# Patient Record
Sex: MALE | Race: WHITE | URBAN - METROPOLITAN AREA
[De-identification: names, ages, dates, MRNs, and addresses within clinical notes are randomized per-mention and may not be internally consistent; named-entity substitution may affect disease eponyms.]

---

## 2017-01-23 DIAGNOSIS — I10 ESSENTIAL HYPERTENSION WITH GOAL BLOOD PRESSURE LESS THAN 140/90: Primary | ICD-10-CM

## 2017-01-23 RX ORDER — LISINOPRIL 40 MG/1
TABLET ORAL
Qty: 90 TABLET | Refills: 0 | Status: SHIPPED | OUTPATIENT
Start: 2017-01-23 | End: 2017-04-24

## 2017-03-12 DIAGNOSIS — I10 ESSENTIAL HYPERTENSION WITH GOAL BLOOD PRESSURE LESS THAN 140/90: ICD-10-CM

## 2017-03-14 RX ORDER — HYDROCHLOROTHIAZIDE 25 MG/1
TABLET ORAL
Qty: 90 TABLET | Refills: 0 | Status: SHIPPED | OUTPATIENT
Start: 2017-03-14 | End: 2017-05-18

## 2017-03-14 NOTE — TELEPHONE ENCOUNTER
Hydrochlorothiazide       Last Written Prescription Date: 12/09/2016  Last Fill Quantity: 90, # refills: 0  Last Office Visit with G, [facility] or [facility] prescribing provider: 09/14/2016       Potassium   Date Value Ref Range Status   06/15/2016 4.2 3.4 - 5.3 mmol/L Final     Creatinine   Date Value Ref Range Status   06/15/2016 1.11 0.66 - 1.25 mg/dL Final     BP Readings from Last 3 Encounters:   12/01/16 126/84   09/14/16 134/88   06/14/16 131/76

## 2017-04-24 DIAGNOSIS — I10 ESSENTIAL HYPERTENSION WITH GOAL BLOOD PRESSURE LESS THAN 140/90: ICD-10-CM

## 2017-04-24 RX ORDER — LISINOPRIL 40 MG/1
TABLET ORAL
Qty: 30 TABLET | Refills: 0 | Status: SHIPPED | OUTPATIENT
Start: 2017-04-24 | End: 2017-05-18

## 2017-04-24 NOTE — TELEPHONE ENCOUNTER
Pending Prescriptions:                       Disp   Refills    lisinopril (PRINIVIL/ZESTRIL) 40 MG table*90 tab*0            Sig: TAKE 1 TABLET (40 MG) BY MOUTH DAILY        Last Written Prescription Date: 1/23/2017  Last Fill Quantity: 90, # refills: 0  Last Office Visit with G, P or [facility] prescribing provider: 9/16/2016       Potassium   Date Value Ref Range Status   06/15/2016 4.2 3.4 - 5.3 mmol/L Final     Creatinine   Date Value Ref Range Status   06/15/2016 1.11 0.66 - 1.25 mg/dL Final     BP Readings from Last 3 Encounters:   12/01/16 126/84   09/14/16 134/88   06/14/16 131/76

## 2017-04-24 NOTE — TELEPHONE ENCOUNTER
Medication is being filled for 1 time refill only due to:  Patient needs to be seen because due for 6 month follow up. Last seen 9/14/16.   Note from 9/14/16 OV:  HTN- okay control on current meds (lisinopril 40mg/d and HCTZ 25mg/d)- will cont to monitor.   Cont q6mo f/u.    Tara Lawson RN

## 2017-05-18 DIAGNOSIS — I10 ESSENTIAL HYPERTENSION WITH GOAL BLOOD PRESSURE LESS THAN 140/90: ICD-10-CM

## 2017-05-18 DIAGNOSIS — E78.5 HYPERLIPIDEMIA LDL GOAL <100: ICD-10-CM
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
## 2017-05-19 RX ORDER — LISINOPRIL 40 MG/1
TABLET ORAL
Qty: 30 TABLET | Refills: 1 | Status: SHIPPED | OUTPATIENT
Start: 2017-05-19 | End: 2017-07-14

## 2017-05-19 RX ORDER — HYDROCHLOROTHIAZIDE 25 MG/1
TABLET ORAL
Qty: 30 TABLET | Refills: 1 | Status: SHIPPED | OUTPATIENT
Start: 2017-05-19 | End: 2017-07-14

## 2017-05-19 RX ORDER — ATORVASTATIN CALCIUM 20 MG/1
20 TABLET, FILM COATED ORAL DAILY
Qty: 30 TABLET | Refills: 1 | Status: SHIPPED | OUTPATIENT
Start: 2017-05-19 | End: 2017-07-14

## 2017-05-19 NOTE — TELEPHONE ENCOUNTER
Sent in rx with one refill (I will be out towards the end of June for a week, and want to make sure he has rx's).  He should come fasting to his next appt.   Thanks!  CW

## 2017-05-23 DIAGNOSIS — I10 ESSENTIAL HYPERTENSION WITH GOAL BLOOD PRESSURE LESS THAN 140/90: ICD-10-CM

## 2017-05-23 RX ORDER — LISINOPRIL 40 MG/1
TABLET ORAL
Start: 2017-05-23

## 2017-05-23 NOTE — TELEPHONE ENCOUNTER
Lisinopril      Last Written Prescription Date: 05/19/2017  Last Fill Quantity: 30, # refills: 1  Last Office Visit with G, P or [facility] prescribing provider: 09/14/2016       Potassium   Date Value Ref Range Status   06/15/2016 4.2 3.4 - 5.3 mmol/L Final     Creatinine   Date Value Ref Range Status   06/15/2016 1.11 0.66 - 1.25 mg/dL Final     BP Readings from Last 3 Encounters:   12/01/16 126/84   09/14/16 134/88   06/14/16 131/76

## 2017-07-14 DIAGNOSIS — I10 ESSENTIAL HYPERTENSION WITH GOAL BLOOD PRESSURE LESS THAN 140/90: ICD-10-CM

## 2017-07-14 DIAGNOSIS — E78.5 HYPERLIPIDEMIA LDL GOAL <100: ICD-10-CM
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
## 2017-07-14 NOTE — TELEPHONE ENCOUNTER
Lisinopril and HCTZ      Last Written Prescription Date: 5-19-17 for both  Last Fill Quantity: 30, # refills: 1  For both  Last [facility] or [facility] prescribing provider: 9-14-16       Potassium   Date Value Ref Range Status   06/15/2016 4.2 3.4 - 5.3 mmol/L Final     Creatinine   Date Value Ref Range Status   06/15/2016 1.11 0.66 - 1.25 mg/dL Final     BP Readings from Last 3 Encounters:   12/01/16 126/84   09/14/16 134/88   06/14/16 131/76     Atorvastatin     Last Written Prescription Date: 5-19-17  Last Fill Quantity: 30, # refills: 1  Last Office Visit with [facility] or [facility] prescribing provider: 9-14-16       Lab Results   Component Value Date    CHOL 139 08/18/2016     Lab Results   Component Value Date    HDL 50 08/18/2016     Lab Results   Component Value Date    LDL 77 08/18/2016     Lab Results   Component Value Date    TRIG 59 08/18/2016     Lab Results   Component Value Date    CHOLHDLRATIO 4.0 10/28/2015

## 2017-07-18 RX ORDER — ATORVASTATIN CALCIUM 20 MG/1
TABLET, FILM COATED ORAL
Qty: 30 TABLET | Refills: 0 | Status: SHIPPED | OUTPATIENT
Start: 2017-07-18 | End: 2017-08-02

## 2017-07-18 RX ORDER — LISINOPRIL 40 MG/1
TABLET ORAL
Qty: 30 TABLET | Refills: 0 | Status: SHIPPED | OUTPATIENT
Start: 2017-07-18 | End: 2017-08-02

## 2017-07-18 RX ORDER — HYDROCHLOROTHIAZIDE 25 MG/1
TABLET ORAL
Qty: 30 TABLET | Refills: 0 | Status: SHIPPED | OUTPATIENT
Start: 2017-07-18 | End: 2017-08-02

## 2017-07-18 NOTE — TELEPHONE ENCOUNTER
CW,  Patient has upcoming OV with you 8/2  Ok to send in 30 day supply?  Patient overdue for appt with you  Anisha HO RN

## 2017-08-02 ENCOUNTER — OFFICE VISIT (OUTPATIENT)
Dept: FAMILY MEDICINE | Facility: CLINIC | Age: 57
End: 2017-08-02
Payer: COMMERCIAL

## 2017-08-02 VITALS
BODY MASS INDEX: 30.38 KG/M2 | SYSTOLIC BLOOD PRESSURE: 127 MMHG | TEMPERATURE: 97 F | HEART RATE: 62 BPM | HEIGHT: 71 IN | WEIGHT: 217 LBS | DIASTOLIC BLOOD PRESSURE: 84 MMHG | OXYGEN SATURATION: 97 %

## 2017-08-02 DIAGNOSIS — E78.5 HYPERLIPIDEMIA LDL GOAL <100: ICD-10-CM

## 2017-08-02 DIAGNOSIS — I10 ESSENTIAL HYPERTENSION WITH GOAL BLOOD PRESSURE LESS THAN 140/90: ICD-10-CM

## 2017-08-02 PROCEDURE — 99213 OFFICE O/P EST LOW 20 MIN: CPT | Performed by: FAMILY MEDICINE

## 2017-08-02 RX ORDER — HYDROCHLOROTHIAZIDE 25 MG/1
TABLET ORAL
Qty: 90 TABLET | Refills: 1 | Status: SHIPPED | OUTPATIENT
Start: 2017-08-02 | End: 2018-02-21

## 2017-08-02 RX ORDER — LISINOPRIL 40 MG/1
TABLET ORAL
Qty: 90 TABLET | Refills: 1 | Status: SHIPPED | OUTPATIENT
Start: 2017-08-02 | End: 2018-02-21

## 2017-08-02 RX ORDER — ATORVASTATIN CALCIUM 20 MG/1
TABLET, FILM COATED ORAL
Qty: 90 TABLET | Refills: 1 | Status: SHIPPED | OUTPATIENT
Start: 2017-08-02 | End: 2018-02-21

## 2017-08-02 NOTE — MR AVS SNAPSHOT
After Visit Summary   8/2/2017    Joe Wilkins    MRN: [id number]           Patient Information     Date Of Birth          1960        Visit Information        Provider Department      8/2/2017 1:00 PM Elida Burris MD [facility]        Today's Diagnoses     Essential hypertension with goal blood pressure less than 140/90        Hyperlipidemia LDL goal <100           Follow-ups after your visit        Future tests that were ordered for you today     Open Future Orders        Priority Expected Expires Ordered    Lipid panel reflex to direct LDL Routine 8/9/2017 2/2/2018 8/2/2017    Albumin Random Urine Quantitative Routine 8/9/2017 2/2/2018 8/2/2017    Comprehensive metabolic panel (BMP + Alb, Alk Phos, ALT, AST, Total. Bili, TP) Routine 8/9/2017 2/2/2018 8/2/2017            Who to contact     If you have questions or need follow up information about today's clinic visit or your schedule please contact [facility] directly at [phone].  Normal or non-critical lab and imaging results will be communicated to you by [employer], letter or phone within 4 business days after the clinic has received the results. If you do not hear from us within 7 days, please contact the clinic through [employer] or phone. If you have a critical or abnormal lab result, we will notify you by phone as soon as possible.  Submit refill requests through [employer] or call your pharmacy and they will forward the refill request to us. Please allow 3 business days for your refill to be completed.          Additional Information About Your Visit        [employer] Information     [employer] gives you secure access to your electronic health record. If you see a primary care provider, you can also send messages to your care team and make appointments. If you have questions, please call your primary care clinic.  If you do not have a primary care provider, please call [phone] and they will assist you.       "  Care EveryWhere ID     This is your Care EveryWhere ID. This could be used by other organizations to access your [place] medical records  [id number]        Your Vitals Were     Pulse Temperature Height Pulse Oximetry BMI (Body Mass Index)       62 97  F (36.1  C) (Oral) 5' 10.5\" (1.791 m) 97% 30.7 kg/m2        Blood Pressure from Last 3 Encounters:   08/02/17 127/84   12/01/16 126/84   09/14/16 134/88    Weight from Last 3 Encounters:   08/02/17 217 lb (98.4 kg)   12/01/16 214 lb 12.8 oz (97.4 kg)   09/14/16 212 lb 4.8 oz (96.3 kg)                 Today's Medication Changes          These changes are accurate as of: 8/2/17  1:36 PM.  If you have any questions, ask your nurse or doctor.               These medicines have changed or have updated prescriptions.        Dose/Directions    atorvastatin 20 MG tablet   Commonly known as:  LIPITOR   This may have changed:  See the new instructions.   Used for:  Hyperlipidemia LDL goal <100   Changed by:  Elida Burris MD        TAKE 1 TABLET (20 MG) BY MOUTH DAILY   Quantity:  90 tablet   Refills:  1       hydrochlorothiazide 25 MG tablet   Commonly known as:  HYDRODIURIL   This may have changed:  See the new instructions.   Used for:  Essential hypertension with goal blood pressure less than 140/90   Changed by:  Elida Burris MD        TAKE 1 TABLET (25 MG) BY MOUTH DAILY   Quantity:  90 tablet   Refills:  1       lisinopril 40 MG tablet   Commonly known as:  PRINIVIL/ZESTRIL   This may have changed:  See the new instructions.   Used for:  Essential hypertension with goal blood pressure less than 140/90   Changed by:  Elida Burris MD        TAKE 1 TABLET (40 MG) BY MOUTH DAILY   Quantity:  90 tablet   Refills:  1            Where to get your medicines      These medications were sent to [id number] - [place], [place] - [address]  [address] [address]     Phone:  [phone]     atorvastatin 20 MG " tablet    hydrochlorothiazide 25 MG tablet    lisinopril 40 MG tablet                Primary Care Provider Office Phone # Fax #    Elida Burris [phone]       [facility] [address]  [facility] [address]        Equal Access to Services     JAKY PACE : Hadii aad ku hadasho Soomaali, waaxda luqadaha, qaybta kaalmada adeegyada, waxay rossanain hayaan adesandra khbrandenfarzaneh millan. So [facility] [phone].    ATENCIÓN: Si habla español, tiene a bal disposición servicios gratuitos de asistencia lingüística. Sophie al [phone].    We comply with applicable federal civil rights laws and [place] laws. We do not discriminate on the basis of race, color, national origin, age, disability sex, sexual orientation or gender identity.            Thank you!     Thank you for choosing [facility]  for your care. Our goal is always to provide you with excellent care. Hearing back from our patients is one way we can continue to improve our services. Please take a few minutes to complete the written survey that you may receive in the mail after your visit with us. Thank you!             Your Updated Medication List - Protect others around you: Learn how to safely use, store and throw away your medicines at www.disposemymeds.org.          This list is accurate as of: 8/2/17  1:36 PM.  Always use your most recent med list.                   Brand Name Dispense Instructions for use Diagnosis    ASPIRIN PO      Take 81 mg by mouth daily        atorvastatin 20 MG tablet    LIPITOR    90 tablet    TAKE 1 TABLET (20 MG) BY MOUTH DAILY    Hyperlipidemia LDL goal <100       cetirizine 10 MG tablet    zyrTEC     Take 10 mg by mouth daily    Allergic rhinitis due to pollen       fluticasone 50 MCG/ACT spray    FLONASE    16 g    Spray 1-2 sprays into both nostrils daily    Allergic rhinitis due to pollen       hydrochlorothiazide 25 MG tablet    HYDRODIURIL    90 tablet    TAKE 1 TABLET  (25 MG) BY MOUTH DAILY    Essential hypertension with goal blood pressure less than 140/90       lisinopril 40 MG tablet    PRINIVIL/ZESTRIL    90 tablet    TAKE 1 TABLET (40 MG) BY MOUTH DAILY    Essential hypertension with goal blood pressure less than 140/90

## 2017-08-02 NOTE — PROGRESS NOTES
SUBJECTIVE:                                                    Joe Wilkins is a 57 year old male who presents to clinic today for the following health issues:      Hypertension Follow-up    Outpatient blood pressures are being checked at home.  Results are 128/85.    Low Salt Diet: no added salt    Amount of exercise or physical activity: 2-3 days/week for an average of 2-3 miles  - plays golf    Problems taking medications regularly: No    Medication side effects: none    Diet: regular (no restrictions)    BP in good control, no issues.    Lipids- started on statin about a year ago.  Mild muscle aches, but only after walking for 18-round golf, no concerns, not interested in taking co-q-10 for it.    No other concerns.    He is not fasting today.    Problem list and histories reviewed & adjusted, as indicated.  Additional history: as documented    Patient Active Problem List   Diagnosis     Essential hypertension with goal blood pressure less than 140/90     CARDIOVASCULAR SCREENING; LDL GOAL LESS THAN 160     Allergic rhinitis due to pollen     Advanced directives, counseling/discussion      Current Outpatient Prescriptions   Medication Sig Dispense Refill     atorvastatin (LIPITOR) 20 MG tablet TAKE 1 TABLET (20 MG) BY MOUTH DAILY 90 tablet 1     lisinopril (PRINIVIL/ZESTRIL) 40 MG tablet TAKE 1 TABLET (40 MG) BY MOUTH DAILY 90 tablet 1     hydrochlorothiazide (HYDRODIURIL) 25 MG tablet TAKE 1 TABLET (25 MG) BY MOUTH DAILY 90 tablet 1     cetirizine (ZYRTEC) 10 MG tablet Take 10 mg by mouth daily       ASPIRIN PO Take 81 mg by mouth daily       [DISCONTINUED] atorvastatin (LIPITOR) 20 MG tablet TAKE 1 TABLET (20 MG) BY MOUTH DAILY 30 tablet 0     [DISCONTINUED] lisinopril (PRINIVIL/ZESTRIL) 40 MG tablet TAKE 1 TABLET (40 MG) BY MOUTH DAILY 30 tablet 0     [DISCONTINUED] hydrochlorothiazide (HYDRODIURIL) 25 MG tablet TAKE 1 TABLET (25 MG) BY MOUTH DAILY 30 tablet 0     fluticasone (FLONASE) 50 MCG/ACT nasal spray  "Spray 1-2 sprays into both nostrils daily 16 g 5     No Known Allergies  Recent Labs   Lab Test  09/14/16   1022  08/18/16   0745  06/15/16   0805  10/28/15   0929   LDL   --   77  154*  147*   HDL   --   50  48  53   TRIG   --   59  104  57   ALT  49   --   51   --    CR   --    --   1.11  1.14   GFRESTIMATED   --    --   68  66   GFRESTBLACK   --    --   83  80   POTASSIUM   --    --   4.2  4.1      BP Readings from Last 3 Encounters:   08/02/17 127/84   12/01/16 126/84   09/14/16 134/88    Wt Readings from Last 3 Encounters:   08/02/17 217 lb (98.4 kg)   12/01/16 214 lb 12.8 oz (97.4 kg)   09/14/16 212 lb 4.8 oz (96.3 kg)                  Labs reviewed in EPIC        Reviewed and updated as needed this visit by clinical staffTobacco  Allergies  Meds  Med Hx  Surg Hx  Fam Hx  Soc Hx      Reviewed and updated as needed this visit by Provider         ROS:  Constitutional, HEENT, cardiovascular, pulmonary, gi and gu systems are negative, except as otherwise noted.      OBJECTIVE:   /84  Pulse 62  Temp 97  F (36.1  C) (Oral)  Ht 5' 10.5\" (1.791 m)  Wt 217 lb (98.4 kg)  SpO2 97%  BMI 30.7 kg/m2  Body mass index is 30.7 kg/(m^2).  GENERAL APPEARANCE: healthy, alert and no distress     EYES: PERRL, sclera clear     HENT: nose and mouth without ulcers or lesions     NECK: no adenopathy, no asymmetry, masses, or scars and thyroid normal to palpation     RESP: lungs clear to auscultation - no rales, rhonchi or wheezes     CV: regular rates and rhythm, normal S1 S2, no S3 or S4 and no murmur, click or rub      Abdomen: soft, nontender, no HSM or masses and bowel sounds normal     Ext: warm, dry, no edema       Diagnostic Test Results:  none     ASSESSMENT/PLAN:       ICD-10-CM    1. Essential hypertension with goal blood pressure less than 140/90 I10 lisinopril (PRINIVIL/ZESTRIL) 40 MG tablet     hydrochlorothiazide (HYDRODIURIL) 25 MG tablet     Albumin Random Urine Quantitative     Comprehensive metabolic " panel (BMP + Alb, Alk Phos, ALT, AST, Total. Bili, TP)   2. Hyperlipidemia LDL goal <100 E78.5 atorvastatin (LIPITOR) 20 MG tablet     Lipid panel reflex to direct LDL     Comprehensive metabolic panel (BMP + Alb, Alk Phos, ALT, AST, Total. Bili, TP)     HTN/Lipids-  Doing well, no concerns, BP looks good.  Taking meds regularly without significant se's (just rare mild muscle aches- not interested in co-q-10 trial).  He is due for yearly fasting labs, though is not fasting today.  Will rtc next week for fasting labs, and do microalbumin at that time as well.  Will send mychart results.  Rec f/u appt in 6 months for physical.      Elida Burris MD  [facility]

## 2017-08-02 NOTE — NURSING NOTE
"Chief Complaint   Patient presents with     Hypertension     follow up     /84  Pulse 62  Temp 97  F (36.1  C) (Oral)  Ht 5' 10.5\" (1.791 m)  Wt 217 lb (98.4 kg)  SpO2 97%  BMI 30.7 kg/m2 Estimated body mass index is 30.7 kg/(m^2) as calculated from the following:    Height as of this encounter: 5' 10.5\" (1.791 m).    Weight as of this encounter: 217 lb (98.4 kg).  BP completed using cuff size: regular       Health Maintenance due pending provider review:  NONE    n/a      Ashleigh Rizzo CMA  "

## 2017-08-08 DIAGNOSIS — E78.5 HYPERLIPIDEMIA LDL GOAL <100: ICD-10-CM

## 2017-08-08 DIAGNOSIS — I10 ESSENTIAL HYPERTENSION WITH GOAL BLOOD PRESSURE LESS THAN 140/90: ICD-10-CM

## 2017-08-08 LAB
ALBUMIN SERPL-MCNC: 3.8 G/DL (ref 3.4–5)
ALP SERPL-CCNC: 66 U/L (ref 40–150)
ALT SERPL W P-5'-P-CCNC: 41 U/L (ref 0–70)
ANION GAP SERPL CALCULATED.3IONS-SCNC: 8 MMOL/L (ref 3–14)
AST SERPL W P-5'-P-CCNC: 24 U/L (ref 0–45)
BILIRUB SERPL-MCNC: 0.5 MG/DL (ref 0.2–1.3)
BUN SERPL-MCNC: 15 MG/DL (ref 7–30)
CALCIUM SERPL-MCNC: 9 MG/DL (ref 8.5–10.1)
CHLORIDE SERPL-SCNC: 105 MMOL/L (ref 94–109)
CHOLEST SERPL-MCNC: 146 MG/DL
CO2 SERPL-SCNC: 27 MMOL/L (ref 20–32)
CREAT SERPL-MCNC: 1.17 MG/DL (ref 0.66–1.25)
CREAT UR-MCNC: 112 MG/DL
GFR SERPL CREATININE-BSD FRML MDRD: 64 ML/MIN/1.7M2
GLUCOSE SERPL-MCNC: 109 MG/DL (ref 70–99)
HDLC SERPL-MCNC: 49 MG/DL
LDLC SERPL CALC-MCNC: 87 MG/DL
MICROALBUMIN UR-MCNC: 6 MG/L
MICROALBUMIN/CREAT UR: 5.28 MG/G CR (ref 0–17)
NONHDLC SERPL-MCNC: 97 MG/DL
POTASSIUM SERPL-SCNC: 3.8 MMOL/L (ref 3.4–5.3)
PROT SERPL-MCNC: 6.7 G/DL (ref 6.8–8.8)
SODIUM SERPL-SCNC: 140 MMOL/L (ref 133–144)
TRIGL SERPL-MCNC: 52 MG/DL

## 2017-08-08 PROCEDURE — 82043 UR ALBUMIN QUANTITATIVE: CPT | Performed by: FAMILY MEDICINE

## 2017-08-08 PROCEDURE — 36415 COLL VENOUS BLD VENIPUNCTURE: CPT | Performed by: FAMILY MEDICINE

## 2017-08-08 PROCEDURE — 80053 COMPREHEN METABOLIC PANEL: CPT | Performed by: FAMILY MEDICINE

## 2017-08-08 PROCEDURE — 80061 LIPID PANEL: CPT | Performed by: FAMILY MEDICINE

## 2017-08-11 NOTE — PROGRESS NOTES
Here are your lab results from your recent visit...  -Your CMP (which includes electrolyte levels, blood sugar levels, and kidney and liver function tests) looks normal other than the elevated glucose, which is in the pre-diabetic range because you were fasting (100-125 is the pre-diabetic range).  We should continue to check this yearly, and cutting back on carbohydrates, sweets and sugars while increasing vegetables and lean proteins may help.  -Your microalbumin level (which is the urine test that can signal signs of early chronic kidney disease if elevated to >30) is normal.  -Your cholesterol panel looks very good with a low LDL (the bad cholesterol) and a decent HDL (the good cholesterol).     Please let me know if you have any questions.  Best,   Thomas Burris MD

## 2017-08-20 DIAGNOSIS — E78.5 HYPERLIPIDEMIA LDL GOAL <100: ICD-10-CM

## 2017-08-21 RX ORDER — ATORVASTATIN CALCIUM 20 MG/1
TABLET, FILM COATED ORAL
Start: 2017-08-21

## 2017-08-21 NOTE — TELEPHONE ENCOUNTER
Disp Refills Start End JUANA      atorvastatin (LIPITOR) 20 MG tablet 90 tablet 1 8/2/2017  No

## 2018-02-21 DIAGNOSIS — E78.5 HYPERLIPIDEMIA LDL GOAL <100: ICD-10-CM

## 2018-02-21 DIAGNOSIS — I10 ESSENTIAL HYPERTENSION WITH GOAL BLOOD PRESSURE LESS THAN 140/90: ICD-10-CM

## 2018-02-21 RX ORDER — LISINOPRIL 40 MG/1
TABLET ORAL
Qty: 30 TABLET | Refills: 0 | Status: SHIPPED | OUTPATIENT
Start: 2018-02-21 | End: 2018-03-21

## 2018-02-21 RX ORDER — HYDROCHLOROTHIAZIDE 25 MG/1
TABLET ORAL
Qty: 30 TABLET | Refills: 0 | Status: SHIPPED | OUTPATIENT
Start: 2018-02-21 | End: 2018-03-21

## 2018-02-21 RX ORDER — ATORVASTATIN CALCIUM 20 MG/1
TABLET, FILM COATED ORAL
Qty: 30 TABLET | Refills: 0 | Status: SHIPPED | OUTPATIENT
Start: 2018-02-21 | End: 2018-03-21

## 2018-02-21 NOTE — TELEPHONE ENCOUNTER
"Medication is being filled for 1 time refill only due to:  Patient needs to be seen because due for physical.\  Note from 8/8/17 OV:  Rec f/u appt in 6 months for physical.  Tara Lawson RN    Requested Prescriptions   Signed Prescriptions Disp Refills     hydrochlorothiazide (HYDRODIURIL) 25 MG tablet 30 tablet 0     Sig: TAKE 1 TABLET (25 MG) BY MOUTH DAILY    Diuretics (Including Combos) Protocol Passed    2/21/2018  5:42 AM       Passed - Blood pressure under 140/90 in past 12 months    BP Readings from Last 3 Encounters:   08/02/17 127/84   12/01/16 126/84   09/14/16 134/88                Passed - Recent or future visit with authorizing provider's specialty    Patient had office visit in the last year or has a visit in the next 30 days with authorizing provider.  See \"Patient Info\" tab in inbasket, or \"Choose Columns\" in Meds & Orders section of the refill encounter.            Passed - Patient is age 18 or older       Passed - Normal serum creatinine on file in past 12 months    Recent Labs   Lab Test  08/08/17   0739   CR  1.17             Passed - Normal serum potassium on file in past 12 months    Recent Labs   Lab Test  08/08/17   0739   POTASSIUM  3.8                   Passed - Normal serum sodium on file in past 12 months    Recent Labs   Lab Test  08/08/17   0739   NA  140              lisinopril (PRINIVIL/ZESTRIL) 40 MG tablet 30 tablet 0     Sig: TAKE 1 TABLET (40 MG) BY MOUTH DAILY    ACE Inhibitors (Including Combos) Protocol Passed    2/21/2018  5:42 AM       Passed - Blood pressure under 140/90 in past 12 months    BP Readings from Last 3 Encounters:   08/02/17 127/84   12/01/16 126/84   09/14/16 134/88                Passed - Recent or future visit with authorizing provider's specialty    Patient had office visit in the last year or has a visit in the next 30 days with authorizing provider.  See \"Patient Info\" tab in inbasket, or \"Choose Columns\" in Meds & Orders section of the refill encounter. " "           Passed - Patient is age 18 or older       Passed - Normal serum creatinine on file in past 12 months    Recent Labs   Lab Test  08/08/17   0739   CR  1.17            Passed - Normal serum potassium on file in past 12 months    Recent Labs   Lab Test  08/08/17   0739   POTASSIUM  3.8             atorvastatin (LIPITOR) 20 MG tablet 30 tablet 0     Sig: TAKE 1 TABLET (20 MG) BY MOUTH DAILY    Statins Protocol Passed    2/21/2018  5:42 AM       Passed - LDL on file in past 12 months    Recent Labs   Lab Test  08/08/17   0739   LDL  87            Passed - No abnormal creatine kinase in past 12 months    No lab results found.         Passed - Recent or future visit with authorizing provider    Patient had office visit in the last year or has a visit in the next 30 days with authorizing provider.  See \"Patient Info\" tab in inbasket, or \"Choose Columns\" in Meds & Orders section of the refill encounter.            Passed - Patient is age 18 or older            "

## 2018-03-21 DIAGNOSIS — I10 ESSENTIAL HYPERTENSION WITH GOAL BLOOD PRESSURE LESS THAN 140/90: ICD-10-CM

## 2018-03-21 DIAGNOSIS — E78.5 HYPERLIPIDEMIA LDL GOAL <100: ICD-10-CM

## 2018-03-22 NOTE — TELEPHONE ENCOUNTER
"Per CW in 8/2/2017 OV, f/u in 6 months for physical  [employer] message sent to pt advising appt  Anisha HO RN    Requested Prescriptions   Pending Prescriptions Disp Refills     hydrochlorothiazide (HYDRODIURIL) 25 MG tablet [Pharmacy Med Name: HYDROCHLOROTHIAZIDE 25 MG TAB] 30 tablet 0     Sig: TAKE 1 TABLET (25 MG) BY MOUTH DAILY    Diuretics (Including Combos) Protocol Passed    3/21/2018  6:18 PM       Passed - Blood pressure under 140/90 in past 12 months    BP Readings from Last 3 Encounters:   08/02/17 127/84   12/01/16 126/84   09/14/16 134/88                Passed - Recent (12 mo) or future (30 days) visit within the authorizing provider's specialty    Patient had office visit in the last 12 months or has a visit in the next 30 days with authorizing provider or within the authorizing provider's specialty.  See \"Patient Info\" tab in inbasket, or \"Choose Columns\" in Meds & Orders section of the refill encounter.           Passed - Patient is age 18 or older       Passed - Normal serum creatinine on file in past 12 months    Recent Labs   Lab Test  08/08/17   0739   CR  1.17             Passed - Normal serum potassium on file in past 12 months    Recent Labs   Lab Test  08/08/17   0739   POTASSIUM  3.8                   Passed - Normal serum sodium on file in past 12 months    Recent Labs   Lab Test  08/08/17   0739   NA  140              lisinopril (PRINIVIL/ZESTRIL) 40 MG tablet [Pharmacy Med Name: LISINOPRIL 40 MG TABLET] 30 tablet 0     Sig: TAKE 1 TABLET (40 MG) BY MOUTH DAILY    ACE Inhibitors (Including Combos) Protocol Passed    3/21/2018  6:18 PM       Passed - Blood pressure under 140/90 in past 12 months    BP Readings from Last 3 Encounters:   08/02/17 127/84   12/01/16 126/84   09/14/16 134/88                Passed - Recent (12 mo) or future (30 days) visit within the authorizing provider's specialty    Patient had office visit in the last 12 months or has a visit in the next 30 days with " "authorizing provider or within the authorizing provider's specialty.  See \"Patient Info\" tab in inbasket, or \"Choose Columns\" in Meds & Orders section of the refill encounter.           Passed - Patient is age 18 or older       Passed - Normal serum creatinine on file in past 12 months    Recent Labs   Lab Test  08/08/17   0739   CR  1.17            Passed - Normal serum potassium on file in past 12 months    Recent Labs   Lab Test  08/08/17   0739   POTASSIUM  3.8             atorvastatin (LIPITOR) 20 MG tablet [Pharmacy Med Name: ATORVASTATIN 20 MG TABLET] 30 tablet 0     Sig: TAKE 1 TABLET (20 MG) BY MOUTH DAILY    Statins Protocol Passed    3/21/2018  6:18 PM       Passed - LDL on file in past 12 months    Recent Labs   Lab Test  08/08/17   0739   LDL  87            Passed - No abnormal creatine kinase in past 12 months    Recent Labs   Lab Test  09/14/16   1022   CKT  71               Passed - Recent (12 mo) or future (30 days) visit within the authorizing provider's specialty    Patient had office visit in the last 12 months or has a visit in the next 30 days with authorizing provider or within the authorizing provider's specialty.  See \"Patient Info\" tab in inbasket, or \"Choose Columns\" in Meds & Orders section of the refill encounter.           Passed - Patient is age 18 or older              "

## 2018-03-26 NOTE — TELEPHONE ENCOUNTER
Patient given 1 month supply of medications on 2/21/18  Per CW in 8/2/2017 OV, f/u in 6 months for physical  Pharmacist informed.    Patient has not read [employer] message sent on 3/22  VM left asking patient to call clinic  Tara Lawson RN

## 2018-03-26 NOTE — TELEPHONE ENCOUNTER
[facility] called to check status of refill rcvd. Advised that refill has been rcvd and is in process.

## 2018-03-27 NOTE — TELEPHONE ENCOUNTER
CW,  See below messages  No response from patient to our outreach  Please advise on further refill  Thanks,  Anisha HO RN

## 2018-03-29 RX ORDER — HYDROCHLOROTHIAZIDE 25 MG/1
TABLET ORAL
Qty: 30 TABLET | Refills: 0 | Status: SHIPPED | OUTPATIENT
Start: 2018-03-29 | End: 2018-05-18

## 2018-03-29 RX ORDER — ATORVASTATIN CALCIUM 20 MG/1
TABLET, FILM COATED ORAL
Qty: 30 TABLET | Refills: 0 | Status: SHIPPED | OUTPATIENT
Start: 2018-03-29 | End: 2018-05-18

## 2018-03-29 RX ORDER — LISINOPRIL 40 MG/1
TABLET ORAL
Qty: 30 TABLET | Refills: 0 | Status: SHIPPED | OUTPATIENT
Start: 2018-03-29 | End: 2018-05-18

## 2018-05-18 ENCOUNTER — OFFICE VISIT (OUTPATIENT)
Dept: FAMILY MEDICINE | Facility: CLINIC | Age: 58
End: 2018-05-18
Payer: COMMERCIAL

## 2018-05-18 VITALS
TEMPERATURE: 98 F | WEIGHT: 215.7 LBS | HEART RATE: 63 BPM | DIASTOLIC BLOOD PRESSURE: 81 MMHG | SYSTOLIC BLOOD PRESSURE: 121 MMHG | BODY MASS INDEX: 30.2 KG/M2 | OXYGEN SATURATION: 97 % | RESPIRATION RATE: 16 BRPM | HEIGHT: 71 IN

## 2018-05-18 DIAGNOSIS — I10 ESSENTIAL HYPERTENSION WITH GOAL BLOOD PRESSURE LESS THAN 140/90: Primary | ICD-10-CM

## 2018-05-18 DIAGNOSIS — R73.09 ELEVATED GLUCOSE: ICD-10-CM

## 2018-05-18 DIAGNOSIS — E78.5 HYPERLIPIDEMIA LDL GOAL <100: ICD-10-CM

## 2018-05-18 DIAGNOSIS — Z12.11 COLON CANCER SCREENING: ICD-10-CM

## 2018-05-18 PROCEDURE — 82274 ASSAY TEST FOR BLOOD FECAL: CPT | Performed by: FAMILY MEDICINE

## 2018-05-18 PROCEDURE — 99214 OFFICE O/P EST MOD 30 MIN: CPT | Performed by: FAMILY MEDICINE

## 2018-05-18 RX ORDER — LISINOPRIL 40 MG/1
TABLET ORAL
Qty: 90 TABLET | Refills: 1 | Status: SHIPPED | OUTPATIENT
Start: 2018-05-18 | End: 2018-11-20

## 2018-05-18 RX ORDER — ATORVASTATIN CALCIUM 20 MG/1
TABLET, FILM COATED ORAL
Qty: 90 TABLET | Refills: 1 | Status: SHIPPED | OUTPATIENT
Start: 2018-05-18 | End: 2018-11-20

## 2018-05-18 RX ORDER — HYDROCHLOROTHIAZIDE 25 MG/1
TABLET ORAL
Qty: 90 TABLET | Refills: 1 | Status: SHIPPED | OUTPATIENT
Start: 2018-05-18 | End: 2018-11-20

## 2018-05-18 NOTE — PROGRESS NOTES
SUBJECTIVE:   Joe Wilkins is a 57 year old male who presents to clinic today for the following health issues:    Hypertension Follow-up    Outpatient blood pressures are being checked at home.  Results are usually normal 120s/80s.  Home monitor.    Low Salt Diet: no added salt    Amount of exercise or physical activity: 2 days (playing golf, walks the 18 holes)    Problems taking medications regularly: No    Medication side effects: none    Diet: regular (no restrictions)    Had some word-finding issues when he first went on meds.  Not sure, but thinks they may be less prominent now.    Lipids- doing well on the lipitor, no se's.  LDL has looked good since going on statin.    Reviewed glucose elevation of 103 and 109 the last couple times.    Hasn't had a physical in awhile- will look into scheduling it as that next time.  Reviewed colon cancer screening - last one done in [place], and thinks he had a polyp and was to recheck in 5 yrs (he had initially said 10-yr f/u).  Now overdue.  Can't have it done here- doesn't have a  as his home is in [place] [facility] (work is based here).  Would be open to doing FIT in meantime.      Problem list and histories reviewed & adjusted, as indicated.  Additional history: as documented    Patient Active Problem List   Diagnosis     Essential hypertension with goal blood pressure less than 140/90     CARDIOVASCULAR SCREENING; LDL GOAL LESS THAN 160     Allergic rhinitis due to pollen     Advanced directives, counseling/discussion     Hyperlipidemia LDL goal <100     Elevated glucose     Past Surgical History:   Procedure Laterality Date     C ANESTH,ACHILLES TENDON SURG  1994     LASIK  2000       Social History   Substance Use Topics     Smoking status: Never Smoker     Smokeless tobacco: Never Used     Alcohol use 0.0 oz/week     0 Standard drinks or equivalent per week      Comment: glass wine 2-3 days     Family History   Problem Relation Age of Onset     Eye Disorder Mother       High cholesterol Father      HEART DISEASE Father 62     pericarditis causing rupture     Hypertension Father      DIABETES Maternal Grandmother      Hypertension Sister      High cholesterol Sister      C.A.D. Paternal Uncle 58     mult uncles w/ CAD in late 50s, early 60s         Current Outpatient Prescriptions   Medication Sig Dispense Refill     ASPIRIN PO Take 81 mg by mouth daily       atorvastatin (LIPITOR) 20 MG tablet TAKE 1 TABLET (20 MG) BY MOUTH DAILY 90 tablet 1     cetirizine (ZYRTEC) 10 MG tablet Take 10 mg by mouth daily       hydrochlorothiazide (HYDRODIURIL) 25 MG tablet TAKE 1 TABLET (25 MG) BY MOUTH DAILY 90 tablet 1     lisinopril (PRINIVIL/ZESTRIL) 40 MG tablet TAKE 1 TABLET (40 MG) BY MOUTH DAILY 90 tablet 1     [DISCONTINUED] atorvastatin (LIPITOR) 20 MG tablet TAKE 1 TABLET (20 MG) BY MOUTH DAILY 30 tablet 0     [DISCONTINUED] hydrochlorothiazide (HYDRODIURIL) 25 MG tablet TAKE 1 TABLET (25 MG) BY MOUTH DAILY 30 tablet 0     [DISCONTINUED] lisinopril (PRINIVIL/ZESTRIL) 40 MG tablet TAKE 1 TABLET (40 MG) BY MOUTH DAILY 30 tablet 0     No Known Allergies  Recent Labs   Lab Test  08/08/17   0739  09/14/16   1022  08/18/16   0745  06/15/16   0805   LDL  87   --   77  154*   HDL  49   --   50  48   TRIG  52   --   59  104   ALT  41  49   --   51   CR  1.17   --    --   1.11   GFRESTIMATED  64   --    --   68   GFRESTBLACK  77   --    --   83   POTASSIUM  3.8   --    --   4.2      BP Readings from Last 3 Encounters:   05/18/18 121/81   08/02/17 127/84   12/01/16 126/84    Wt Readings from Last 3 Encounters:   05/18/18 215 lb 11.2 oz (97.8 kg)   08/02/17 217 lb (98.4 kg)   12/01/16 214 lb 12.8 oz (97.4 kg)                  Labs reviewed in EPIC    Reviewed and updated as needed this visit by clinical staff       Reviewed and updated as needed this visit by Provider         ROS:  Constitutional, HEENT, cardiovascular, pulmonary, gi and gu systems are negative, except as otherwise  "noted.    OBJECTIVE:     /81  Pulse 63  Temp 98  F (36.7  C) (Oral)  Resp 16  Ht 5' 10.5\" (1.791 m)  Wt 215 lb 11.2 oz (97.8 kg)  SpO2 97%  BMI 30.51 kg/m2  Body mass index is 30.51 kg/(m^2).  GENERAL APPEARANCE: healthy, alert and no distress     EYES: PERRL, sclera clear     HENT: nose and mouth without ulcers or lesions     NECK: no adenopathy, no asymmetry, masses, or scars and thyroid normal to palpation     RESP: lungs clear to auscultation - no rales, rhonchi or wheezes     CV: regular rates and rhythm, normal S1 S2, no S3 or S4 and no murmur, click or rub      Abdomen: soft, nontender, no HSM or masses and bowel sounds normal     Ext: warm, dry, no edema         ASSESSMENT/PLAN:       ICD-10-CM    1. Essential hypertension with goal blood pressure less than 140/90 I10 lisinopril (PRINIVIL/ZESTRIL) 40 MG tablet     hydrochlorothiazide (HYDRODIURIL) 25 MG tablet   2. Hyperlipidemia LDL goal <100 E78.5 atorvastatin (LIPITOR) 20 MG tablet   3. Elevated glucose R73.09    4. Colon cancer screening Z12.11 Fecal colorectal cancer screen (FIT)     HTN/Lipids- doing well on meds for both issues, no significant se's.  Will cont.  Cont f/u q6mo.  Next appt likely physical with fasting labs.    Elevated glucose- reviewed pre-diabetic glucose readings with the last couple fasting labs (103 and 109).  Reviewed diet- rec lower carbs (especially refined, look for whole grain options, but less in general), less sweets/sugars as well, weight loss.  Cont to monitor, likely check A1C with next lab draw.    Colon cancer screening- we had q10 yr f/u on HM, likely mistake, pt thinks it was to be q5 yr f/u so now overdue.  Difficult for him to schedule scope, needs to be in [facility], so will do FIT for now, and work on scheduling issues for next year.    Elida Burris MD  [facility]    "

## 2018-05-18 NOTE — MR AVS SNAPSHOT
After Visit Summary   5/18/2018    Joe Wilkins    MRN: [id number]           Patient Information     Date Of Birth          1960        Visit Information        Provider Department      5/18/2018 8:30 AM Elida Burris MD [facility]        Today's Diagnoses     Essential hypertension with goal blood pressure less than 140/90    -  1    Hyperlipidemia LDL goal <100        CARDIOVASCULAR SCREENING; LDL GOAL LESS THAN 160        Colon cancer screening           Follow-ups after your visit        Future tests that were ordered for you today     Open Future Orders        Priority Expected Expires Ordered    Fecal colorectal cancer screen (FIT) Routine 6/8/2018 8/10/2018 5/18/2018            Who to contact     If you have questions or need follow up information about today's clinic visit or your schedule please contact [facility] directly at [phone].  Normal or non-critical lab and imaging results will be communicated to you by boom[employer]hart, letter or phone within 4 business days after the clinic has received the results. If you do not hear from us within 7 days, please contact the clinic through boomtrainhart or phone. If you have a critical or abnormal lab result, we will notify you by phone as soon as possible.  Submit refill requests through [employer] or call your pharmacy and they will forward the refill request to us. Please allow 3 business days for your refill to be completed.          Additional Information About Your Visit        MyChart Information     [employer] gives you secure access to your electronic health record. If you see a primary care provider, you can also send messages to your care team and make appointments. If you have questions, please call your primary care clinic.  If you do not have a primary care provider, please call [phone] and they will assist you.        Care EveryWhere ID     This is your Care EveryWhere ID. This could be used by other  "organizations to access your [place] medical records  [id number]        Your Vitals Were     Pulse Temperature Respirations Height Pulse Oximetry BMI (Body Mass Index)    63 98  F (36.7  C) (Oral) 16 5' 10.5\" (1.791 m) 97% 30.51 kg/m2       Blood Pressure from Last 3 Encounters:   05/18/18 121/81   08/02/17 127/84   12/01/16 126/84    Weight from Last 3 Encounters:   05/18/18 215 lb 11.2 oz (97.8 kg)   08/02/17 217 lb (98.4 kg)   12/01/16 214 lb 12.8 oz (97.4 kg)                 Today's Medication Changes          These changes are accurate as of 5/18/18  8:54 AM.  If you have any questions, ask your nurse or doctor.               These medicines have changed or have updated prescriptions.        Dose/Directions    atorvastatin 20 MG tablet   Commonly known as:  LIPITOR   This may have changed:  See the new instructions.   Used for:  Hyperlipidemia LDL goal <100   Changed by:  Elida Burris MD        TAKE 1 TABLET (20 MG) BY MOUTH DAILY   Quantity:  90 tablet   Refills:  1       hydrochlorothiazide 25 MG tablet   Commonly known as:  HYDRODIURIL   This may have changed:  See the new instructions.   Used for:  Essential hypertension with goal blood pressure less than 140/90   Changed by:  Elida Burris MD        TAKE 1 TABLET (25 MG) BY MOUTH DAILY   Quantity:  90 tablet   Refills:  1       lisinopril 40 MG tablet   Commonly known as:  PRINIVIL/ZESTRIL   This may have changed:  See the new instructions.   Used for:  Essential hypertension with goal blood pressure less than 140/90   Changed by:  Elida Burris MD        TAKE 1 TABLET (40 MG) BY MOUTH DAILY   Quantity:  90 tablet   Refills:  1            Where to get your medicines      These medications were sent to [id number] - [place], [place] - [address] [place] [id number]     Phone:  [phone]     atorvastatin 20 MG tablet    hydrochlorothiazide 25 MG tablet    lisinopril 40 MG " tablet                Primary Care Provider Office Phone # Fax #    Elida Burris [phone] [address] [facility]  [address]  [facility] [address]        Equal Access to Services     JAKY PACE : Hadalessandro monica andrade gilberto Sostanleyali, waaxda luqadaha, qaybta kaalmada adeegyada, stacie negro laAna Lauralisa millan. So [facility] [phone].    ATENCIÓN: Si habla español, tiene a bal disposición servicios gratuitos de asistencia lingüística. Llame al [phone].    We comply with applicable federal civil rights laws and [place] laws. We do not discriminate on the basis of race, color, national origin, age, disability, sex, sexual orientation, or gender identity.            Thank you!     Thank you for choosing [facility]  for your care. Our goal is always to provide you with excellent care. Hearing back from our patients is one way we can continue to improve our services. Please take a few minutes to complete the written survey that you may receive in the mail after your visit with us. Thank you!             Your Updated Medication List - Protect others around you: Learn how to safely use, store and throw away your medicines at www.disposemymeds.org.          This list is accurate as of 5/18/18  8:54 AM.  Always use your most recent med list.                   Brand Name Dispense Instructions for use Diagnosis    ASPIRIN PO      Take 81 mg by mouth daily        atorvastatin 20 MG tablet    LIPITOR    90 tablet    TAKE 1 TABLET (20 MG) BY MOUTH DAILY    Hyperlipidemia LDL goal <100       cetirizine 10 MG tablet    zyrTEC     Take 10 mg by mouth daily    Allergic rhinitis due to pollen       hydrochlorothiazide 25 MG tablet    HYDRODIURIL    90 tablet    TAKE 1 TABLET (25 MG) BY MOUTH DAILY    Essential hypertension with goal blood pressure less than 140/90       lisinopril 40 MG tablet    PRINIVIL/ZESTRIL    90 tablet    TAKE 1 TABLET (40 MG) BY MOUTH DAILY    Essential hypertension  with goal blood pressure less than 140/90

## 2018-05-18 NOTE — NURSING NOTE
"Chief Complaint   Patient presents with     Hypertension     /81  Pulse 63  Temp 98  F (36.7  C) (Oral)  Resp 16  Ht 5' 10.5\" (1.791 m)  Wt 215 lb 11.2 oz (97.8 kg)  SpO2 97%  BMI 30.51 kg/m2 Estimated body mass index is 30.51 kg/(m^2) as calculated from the following:    Height as of this encounter: 5' 10.5\" (1.791 m).    Weight as of this encounter: 215 lb 11.2 oz (97.8 kg).        Health Maintenance due pending provider review:  NONE    n/a    Trish Cha CMA  "

## 2018-05-21 ENCOUNTER — HEALTH MAINTENANCE LETTER (OUTPATIENT)
Age: 58
End: 2018-05-21

## 2018-05-22 DIAGNOSIS — Z12.11 COLON CANCER SCREENING: ICD-10-CM

## 2018-05-22 LAB — HEMOCCULT STL QL IA: NEGATIVE

## 2018-05-22 NOTE — PROGRESS NOTES
Here are your lab results from your recent visit...  -Your FIT test (the screening test for colon cancer by testing for blood in your stool), was negative.  We should continue to check this yearly (unless you have a colonoscopy instead).      Please let me know if you have any questions.  Thomas Perez MD

## 2018-11-20 DIAGNOSIS — I10 ESSENTIAL HYPERTENSION WITH GOAL BLOOD PRESSURE LESS THAN 140/90: ICD-10-CM

## 2018-11-20 DIAGNOSIS — E78.5 HYPERLIPIDEMIA LDL GOAL <100: ICD-10-CM

## 2018-11-21 RX ORDER — LISINOPRIL 40 MG/1
TABLET ORAL
Qty: 30 TABLET | Refills: 0 | Status: SHIPPED | OUTPATIENT
Start: 2018-11-21 | End: 2018-12-23

## 2018-11-21 RX ORDER — ATORVASTATIN CALCIUM 20 MG/1
TABLET, FILM COATED ORAL
Qty: 30 TABLET | Refills: 0 | Status: SHIPPED | OUTPATIENT
Start: 2018-11-21 | End: 2018-12-23

## 2018-11-21 RX ORDER — HYDROCHLOROTHIAZIDE 25 MG/1
TABLET ORAL
Qty: 30 TABLET | Refills: 0 | Status: SHIPPED | OUTPATIENT
Start: 2018-11-21 | End: 2018-12-22

## 2018-11-21 NOTE — TELEPHONE ENCOUNTER
"Medication is being filled for 1 time refill only due to:  Patient needs to be seen because due for fasting physical.   Anisha HO, RN    Requested Prescriptions   Pending Prescriptions Disp Refills     atorvastatin (LIPITOR) 20 MG tablet [Pharmacy Med Name: ATORVASTATIN 20 MG TABLET] 90 tablet 1     Sig: TAKE 1 TABLET (20 MG) BY MOUTH DAILY    Statins Protocol Failed    11/20/2018  1:09 AM       Failed - LDL on file in past 12 months    Recent Labs   Lab Test  08/08/17   0739   LDL  87            Passed - No abnormal creatine kinase in past 12 months    Recent Labs   Lab Test  09/14/16   1022   CKT  71               Passed - Recent (12 mo) or future (30 days) visit within the authorizing provider's specialty    Patient had office visit in the last 12 months or has a visit in the next 30 days with authorizing provider or within the authorizing provider's specialty.  See \"Patient Info\" tab in inbasket, or \"Choose Columns\" in Meds & Orders section of the refill encounter.             Passed - Patient is age 18 or older        lisinopril (PRINIVIL/ZESTRIL) 40 MG tablet [Pharmacy Med Name: LISINOPRIL 40 MG TABLET] 90 tablet 1     Sig: TAKE 1 TABLET (40 MG) BY MOUTH DAILY    ACE Inhibitors (Including Combos) Protocol Failed    11/20/2018  1:09 AM       Failed - Normal serum creatinine on file in past 12 months    Recent Labs   Lab Test  08/08/17   0739   CR  1.17            Failed - Normal serum potassium on file in past 12 months    Recent Labs   Lab Test  08/08/17   0739   POTASSIUM  3.8            Passed - Blood pressure under 140/90 in past 12 months    BP Readings from Last 3 Encounters:   05/18/18 121/81   08/02/17 127/84   12/01/16 126/84                Passed - Recent (12 mo) or future (30 days) visit within the authorizing provider's specialty    Patient had office visit in the last 12 months or has a visit in the next 30 days with authorizing provider or within the authorizing provider's specialty.  See \"Patient " "Info\" tab in inbasket, or \"Choose Columns\" in Meds & Orders section of the refill encounter.             Passed - Patient is age 18 or older        hydrochlorothiazide (HYDRODIURIL) 25 MG tablet [Pharmacy Med Name: HYDROCHLOROTHIAZIDE 25 MG TAB] 90 tablet 1     Sig: TAKE 1 TABLET BY MOUTH EVERY DAY    Diuretics (Including Combos) Protocol Failed    11/20/2018  1:09 AM       Failed - Normal serum creatinine on file in past 12 months    Recent Labs   Lab Test  08/08/17   0739   CR  1.17             Failed - Normal serum potassium on file in past 12 months    Recent Labs   Lab Test  08/08/17   0739   POTASSIUM  3.8                   Failed - Normal serum sodium on file in past 12 months    Recent Labs   Lab Test  08/08/17   0739   NA  140             Passed - Blood pressure under 140/90 in past 12 months    BP Readings from Last 3 Encounters:   05/18/18 121/81   08/02/17 127/84   12/01/16 126/84                Passed - Recent (12 mo) or future (30 days) visit within the authorizing provider's specialty    Patient had office visit in the last 12 months or has a visit in the next 30 days with authorizing provider or within the authorizing provider's specialty.  See \"Patient Info\" tab in inbasket, or \"Choose Columns\" in Meds & Orders section of the refill encounter.             Passed - Patient is age 18 or older            "

## 2018-12-22 DIAGNOSIS — I10 ESSENTIAL HYPERTENSION WITH GOAL BLOOD PRESSURE LESS THAN 140/90: ICD-10-CM

## 2018-12-22 NOTE — LETTER
December 26, 2018      Joe Wilkins  [address] [facility] [address]        Dear Joe,      In reviewing your recent refill request for hydrochlorothiazide, it was noted that you are due for a fasting lab draw and an annual physical.    Approval for a 30-day supply of the medication has been sent to your pharmacy.  Additional refills will be approved during your follow up visit.     Please contact our office at [phone] to schedule your lab and doctor's appointment or schedule via [employer].           Sincerely,    [facility]

## 2018-12-23 DIAGNOSIS — E78.5 HYPERLIPIDEMIA LDL GOAL <100: ICD-10-CM

## 2018-12-23 DIAGNOSIS — I10 ESSENTIAL HYPERTENSION WITH GOAL BLOOD PRESSURE LESS THAN 140/90: ICD-10-CM

## 2018-12-26 RX ORDER — HYDROCHLOROTHIAZIDE 25 MG/1
TABLET ORAL
Qty: 30 TABLET | Refills: 0 | Status: SHIPPED | OUTPATIENT
Start: 2018-12-26

## 2018-12-26 NOTE — TELEPHONE ENCOUNTER
"Requested Prescriptions   Pending Prescriptions Disp Refills     hydrochlorothiazide (HYDRODIURIL) 25 MG tablet [Pharmacy Med Name: HYDROCHLOROTHIAZIDE 25 MG TAB] 30 tablet 0     Sig: TAKE 1 TABLET BY MOUTH EVERY DAY    Diuretics (Including Combos) Protocol Failed - 12/22/2018  8:38 AM       Failed - Normal serum creatinine on file in past 12 months    Recent Labs   Lab Test 08/08/17  0739   CR 1.17             Failed - Normal serum potassium on file in past 12 months    Recent Labs   Lab Test 08/08/17  0739   POTASSIUM 3.8                   Failed - Normal serum sodium on file in past 12 months    Recent Labs   Lab Test 08/08/17  0739                Passed - Blood pressure under 140/90 in past 12 months    BP Readings from Last 3 Encounters:   05/18/18 121/81   08/02/17 127/84   12/01/16 126/84                Passed - Recent (12 mo) or future (30 days) visit within the authorizing provider's specialty    Patient had office visit in the last 12 months or has a visit in the next 30 days with authorizing provider or within the authorizing provider's specialty.  See \"Patient Info\" tab in inbasket, or \"Choose Columns\" in Meds & Orders section of the refill encounter.             Passed - Patient is age 18 or older        "

## 2018-12-26 NOTE — TELEPHONE ENCOUNTER
"Patient given 1 month supply of each on 11/21  Due for physical.  J[employer] message sent to patient today asking him to schedule.  Tara Lawson, PEDRO    Requested Prescriptions   Pending Prescriptions Disp Refills     lisinopril (PRINIVIL/ZESTRIL) 40 MG tablet [Pharmacy Med Name: LISINOPRIL 40 MG TABLET] 30 tablet 0     Sig: TAKE 1 TABLET BY MOUTH EVERY DAY    ACE Inhibitors (Including Combos) Protocol Failed - 12/26/2018 10:19 AM       Failed - Normal serum creatinine on file in past 12 months    Recent Labs   Lab Test 08/08/17  0739   CR 1.17            Failed - Normal serum potassium on file in past 12 months    Recent Labs   Lab Test 08/08/17  0739   POTASSIUM 3.8            Passed - Blood pressure under 140/90 in past 12 months    BP Readings from Last 3 Encounters:   05/18/18 121/81   08/02/17 127/84   12/01/16 126/84                Passed - Recent (12 mo) or future (30 days) visit within the authorizing provider's specialty    Patient had office visit in the last 12 months or has a visit in the next 30 days with authorizing provider or within the authorizing provider's specialty.  See \"Patient Info\" tab in inbasket, or \"Choose Columns\" in Meds & Orders section of the refill encounter.             Passed - Patient is age 18 or older        atorvastatin (LIPITOR) 20 MG tablet [Pharmacy Med Name: ATORVASTATIN 20 MG TABLET] 30 tablet 0     Sig: TAKE 1 TABLET BY MOUTH EVERY DAY    Statins Protocol Failed - 12/26/2018 10:19 AM       Failed - LDL on file in past 12 months    Recent Labs   Lab Test 08/08/17  0739   LDL 87            Passed - No abnormal creatine kinase in past 12 months    Recent Labs   Lab Test 09/14/16  1022   CKT 71               Passed - Recent (12 mo) or future (30 days) visit within the authorizing provider's specialty    Patient had office visit in the last 12 months or has a visit in the next 30 days with authorizing provider or within the authorizing provider's specialty.  See \"Patient Info\" tab " "in inbasket, or \"Choose Columns\" in Meds & Orders section of the refill encounter.             Passed - Patient is age 18 or older          "

## 2018-12-31 RX ORDER — ATORVASTATIN CALCIUM 20 MG/1
TABLET, FILM COATED ORAL
Qty: 30 TABLET | Refills: 0 | Status: SHIPPED | OUTPATIENT
Start: 2018-12-31

## 2018-12-31 RX ORDER — LISINOPRIL 40 MG/1
TABLET ORAL
Qty: 30 TABLET | Refills: 0 | Status: SHIPPED | OUTPATIENT
Start: 2018-12-31

## 2018-12-31 NOTE — TELEPHONE ENCOUNTER
CW,   Left VM for patient  See below messages  No response from patient to our outreach  Please advise on further refill  Thanks,  Anisha HO RN

## 2020-03-10 ENCOUNTER — HEALTH MAINTENANCE LETTER (OUTPATIENT)
Age: 60
End: 2020-03-10

## 2020-12-20 ENCOUNTER — HEALTH MAINTENANCE LETTER (OUTPATIENT)
Age: 60
End: 2020-12-20

## 2021-04-24 ENCOUNTER — HEALTH MAINTENANCE LETTER (OUTPATIENT)
Age: 61
End: 2021-04-24

## 2021-10-03 ENCOUNTER — HEALTH MAINTENANCE LETTER (OUTPATIENT)
Age: 61
End: 2021-10-03

## 2022-01-07 NOTE — TELEPHONE ENCOUNTER
Prescription approved per [facility] Refill Protocol.  Due for f/u HTN appt March 2017  Anisha HO RN    Pending Prescriptions:                       Disp   Refills    lisinopril (PRINIVIL/ZESTRIL) 40 MG tablet*90 tab*1        Sig: TAKE 1 TABLET (40 MG) BY MOUTH DAILY        Last Written Prescription Date: 6/14/2016  Last Fill Quantity: 90, # refills: 1  Last Office Visit with [facility] or [facility] prescribing provider: 9/14/2016       POTASSIUM   Date Value Ref Range Status   06/15/2016 4.2 3.4 - 5.3 mmol/L Final     CREATININE   Date Value Ref Range Status   06/15/2016 1.11 0.66 - 1.25 mg/dL Final     BP Readings from Last 3 Encounters:   12/01/16 126/84   09/14/16 134/88   06/14/16 131/76        I personally supervised the study.  I reviewed the images and interpretation by the resident/ACP and have edited where appropriate.  Patient came in with swelling I intend to get a bedside soft tissue US  Soft tissue ultrasound revealed no abscess

## 2022-05-15 ENCOUNTER — HEALTH MAINTENANCE LETTER (OUTPATIENT)
Age: 62
End: 2022-05-15

## 2022-09-10 ENCOUNTER — HEALTH MAINTENANCE LETTER (OUTPATIENT)
Age: 62
End: 2022-09-10

## 2023-06-03 ENCOUNTER — HEALTH MAINTENANCE LETTER (OUTPATIENT)
Age: 63
End: 2023-06-03